# Patient Record
Sex: FEMALE | Race: WHITE | NOT HISPANIC OR LATINO | Employment: STUDENT | URBAN - METROPOLITAN AREA
[De-identification: names, ages, dates, MRNs, and addresses within clinical notes are randomized per-mention and may not be internally consistent; named-entity substitution may affect disease eponyms.]

---
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
## 2024-02-28 ENCOUNTER — APPOINTMENT (OUTPATIENT)
Dept: PRIMARY CARE | Facility: CLINIC | Age: 19
End: 2024-02-28
Payer: COMMERCIAL

## 2024-03-05 ENCOUNTER — OFFICE VISIT (OUTPATIENT)
Dept: PRIMARY CARE | Facility: CLINIC | Age: 19
End: 2024-03-05
Payer: COMMERCIAL

## 2024-03-05 ENCOUNTER — APPOINTMENT (OUTPATIENT)
Dept: PRIMARY CARE | Facility: CLINIC | Age: 19
End: 2024-03-05
Payer: COMMERCIAL

## 2024-03-05 VITALS
DIASTOLIC BLOOD PRESSURE: 80 MMHG | SYSTOLIC BLOOD PRESSURE: 112 MMHG | RESPIRATION RATE: 20 BRPM | TEMPERATURE: 99.1 F | HEART RATE: 88 BPM | HEIGHT: 63 IN | WEIGHT: 139 LBS | BODY MASS INDEX: 24.63 KG/M2

## 2024-03-05 DIAGNOSIS — R00.2 RAPID PALPITATIONS: Primary | ICD-10-CM

## 2024-03-05 DIAGNOSIS — Z13.31 DEPRESSION SCREEN: ICD-10-CM

## 2024-03-05 DIAGNOSIS — R55 SYNCOPE, UNSPECIFIED SYNCOPE TYPE: ICD-10-CM

## 2024-03-05 DIAGNOSIS — Z00.00 HEALTHCARE MAINTENANCE: ICD-10-CM

## 2024-03-05 PROBLEM — J02.0 STREPTOCOCCAL SORE THROAT: Status: RESOLVED | Noted: 2023-10-13 | Resolved: 2024-03-05

## 2024-03-05 PROBLEM — H10.33 ACUTE CONJUNCTIVITIS OF BOTH EYES: Status: RESOLVED | Noted: 2023-10-13 | Resolved: 2024-03-05

## 2024-03-05 PROCEDURE — 96127 BRIEF EMOTIONAL/BEHAV ASSMT: CPT | Performed by: FAMILY MEDICINE

## 2024-03-05 PROCEDURE — 99214 OFFICE O/P EST MOD 30 MIN: CPT | Performed by: FAMILY MEDICINE

## 2024-03-05 RX ORDER — DESOGESTREL AND ETHINYL ESTRADIOL 0.15-0.03
KIT ORAL
COMMUNITY
Start: 2024-02-27

## 2024-03-05 ASSESSMENT — ENCOUNTER SYMPTOMS
FATIGUE: 1
DIARRHEA: 0
FEVER: 0
NERVOUS/ANXIOUS: 0
BLOOD IN STOOL: 0
WHEEZING: 0
CONSTIPATION: 0
VOMITING: 0
HEMATURIA: 0
NAUSEA: 1
DIZZINESS: 1
PALPITATIONS: 1
DYSPHORIC MOOD: 0
TREMORS: 1
COUGH: 0
SHORTNESS OF BREATH: 0
SEIZURES: 0
WEAKNESS: 0
LIGHT-HEADEDNESS: 1

## 2024-03-05 ASSESSMENT — PATIENT HEALTH QUESTIONNAIRE - PHQ9
2. FEELING DOWN, DEPRESSED OR HOPELESS: NOT AT ALL
SUM OF ALL RESPONSES TO PHQ9 QUESTIONS 1 AND 2: 0
1. LITTLE INTEREST OR PLEASURE IN DOING THINGS: NOT AT ALL

## 2024-03-05 NOTE — PROGRESS NOTES
"Subjective   Patient ID: Sara Cheema is a 18 y.o. female who presents for Dizziness (Pt has fainted when reaching up into cabinet. She has also bent down to tie her shoe and fainted. Would like tested for POTS).    HPI     Review of Systems   Constitutional:  Positive for fatigue. Negative for fever.   HENT:  Negative for hearing loss.    Eyes:  Negative for visual disturbance.   Respiratory:  Negative for cough, shortness of breath and wheezing.    Cardiovascular:  Positive for chest pain and palpitations.        Pt concerned regarding POTS  Has already seen card/[facility] and working dx of POTS, did EKG, holter, ordered echo and tilt table but pt never had it done  Pt symptoms at college.  Pt has increased flds, increased salt, used compression hose   Gastrointestinal:  Positive for nausea. Negative for blood in stool, constipation, diarrhea and vomiting.   Genitourinary:  Negative for dyspareunia, hematuria, vaginal bleeding and vaginal discharge.   Neurological:  Positive for dizziness, tremors, syncope and light-headedness. Negative for seizures and weakness.        Pt with working dx of POTS, did not complete all testing   Psychiatric/Behavioral:  Negative for dysphoric mood and suicidal ideas. The patient is not nervous/anxious.        Objective   /80   Pulse 88   Temp 37.3 °C (99.1 °F)   Resp 20   Ht 1.588 m (5' 2.5\")   Wt 63 kg (139 lb)   BMI 25.02 kg/m²     Physical Exam  Vitals and nursing note reviewed.   Constitutional:       General: She is not in acute distress.     Appearance: Normal appearance.   HENT:      Head: Normocephalic and atraumatic.   Cardiovascular:      Rate and Rhythm: Normal rate and regular rhythm.      Heart sounds: Normal heart sounds.   Pulmonary:      Effort: Pulmonary effort is normal.      Breath sounds: Normal breath sounds.   Abdominal:      General: Abdomen is flat. Bowel sounds are normal.      Palpations: Abdomen is soft.   Musculoskeletal:         General: No " deformity.      Cervical back: Neck supple.   Lymphadenopathy:      Cervical: No cervical adenopathy.   Skin:     General: Skin is warm and dry.   Neurological:      General: No focal deficit present.      Mental Status: She is alert.   Psychiatric:         Mood and Affect: Mood normal.         Behavior: Behavior normal.         Assessment/Plan   Problem List Items Addressed This Visit             ICD-10-CM    Depression screen Z13.31    Rapid palpitations - Primary R00.2    Relevant Orders    Referral to Cardiology    CBC    Comprehensive Metabolic Panel    Lipid Panel    TSH with reflex to Free T4 if abnormal    Syncope R55    Relevant Orders    Referral to Cardiology    Vitamin D 25-Hydroxy,Total    Folate    Iron and TIBC    Vitamin B12     Other Visit Diagnoses         Codes    Healthcare maintenance     Z00.00    Relevant Orders    Follow Up In [facility] - PCP - Health Maintenance

## 2024-04-22 ENCOUNTER — APPOINTMENT (OUTPATIENT)
Dept: CARDIOLOGY | Facility: CLINIC | Age: 19
End: 2024-04-22
Payer: COMMERCIAL

## 2024-05-07 ENCOUNTER — APPOINTMENT (OUTPATIENT)
Dept: CARDIOLOGY | Facility: CLINIC | Age: 19
End: 2024-05-07
Payer: COMMERCIAL

## 2024-07-11 ENCOUNTER — OFFICE VISIT (OUTPATIENT)
Dept: URGENT CARE | Facility: CLINIC | Age: 19
End: 2024-07-11
Payer: COMMERCIAL

## 2024-07-11 VITALS
TEMPERATURE: 98.3 F | HEART RATE: 92 BPM | BODY MASS INDEX: 26.26 KG/M2 | DIASTOLIC BLOOD PRESSURE: 77 MMHG | SYSTOLIC BLOOD PRESSURE: 113 MMHG | OXYGEN SATURATION: 95 % | HEIGHT: 63 IN | WEIGHT: 148.2 LBS

## 2024-07-11 DIAGNOSIS — J06.9 VIRAL UPPER RESPIRATORY TRACT INFECTION: Primary | ICD-10-CM

## 2024-07-11 DIAGNOSIS — R05.1 ACUTE COUGH: ICD-10-CM

## 2024-07-11 DIAGNOSIS — J02.9 SORE THROAT: ICD-10-CM

## 2024-07-11 LAB
POC RAPID STREP: NEGATIVE
POC SARS-COV-2 AG BINAX: NORMAL

## 2024-07-11 PROCEDURE — 87880 STREP A ASSAY W/OPTIC: CPT | Mod: CLIA WAIVED TEST

## 2024-07-11 PROCEDURE — 99213 OFFICE O/P EST LOW 20 MIN: CPT

## 2024-07-11 PROCEDURE — 87811 SARS-COV-2 COVID19 W/OPTIC: CPT | Mod: CLIA WAIVED TEST

## 2024-07-11 PROCEDURE — 87636 SARSCOV2 & INF A&B AMP PRB: CPT

## 2024-07-11 RX ORDER — VALACYCLOVIR HYDROCHLORIDE 1 G/1
TABLET, FILM COATED ORAL
COMMUNITY
Start: 2024-07-04

## 2024-07-11 RX ORDER — BENZONATATE 100 MG/1
100 CAPSULE ORAL 3 TIMES DAILY PRN
Qty: 21 CAPSULE | Refills: 0 | Status: SHIPPED | OUTPATIENT
Start: 2024-07-11 | End: 2024-07-21

## 2024-07-11 RX ORDER — PHENAZOPYRIDINE HYDROCHLORIDE 100 MG/1
100 TABLET, FILM COATED ORAL 3 TIMES DAILY PRN
COMMUNITY
Start: 2024-05-15 | End: 2025-05-15

## 2024-07-11 RX ORDER — BROMPHENIRAMINE MALEATE, PSEUDOEPHEDRINE HYDROCHLORIDE, AND DEXTROMETHORPHAN HYDROBROMIDE 2; 30; 10 MG/5ML; MG/5ML; MG/5ML
10 SYRUP ORAL 4 TIMES DAILY PRN
Qty: 120 ML | Refills: 0 | Status: SHIPPED | OUTPATIENT
Start: 2024-07-11 | End: 2024-07-21

## 2024-07-11 ASSESSMENT — ENCOUNTER SYMPTOMS
CARDIOVASCULAR NEGATIVE: 1
DIAPHORESIS: 0
VOMITING: 0
HEADACHES: 1
MYALGIAS: 0
FACIAL SWELLING: 0
DIARRHEA: 1
VOICE CHANGE: 0
SINUS PRESSURE: 0
MUSCULOSKELETAL NEGATIVE: 1
RHINORRHEA: 1
SHORTNESS OF BREATH: 0
BACK PAIN: 0
FATIGUE: 1
FEVER: 1
WEAKNESS: 0
WHEEZING: 0
ABDOMINAL PAIN: 0
DIZZINESS: 0
TROUBLE SWALLOWING: 0
COUGH: 1
PALPITATIONS: 0
NAUSEA: 0
CHEST TIGHTNESS: 0
CHILLS: 0
SORE THROAT: 1
ARTHRALGIAS: 0

## 2024-07-11 ASSESSMENT — VISUAL ACUITY: OU: 1

## 2024-07-11 NOTE — PROGRESS NOTES
Subjective   History  Sara Cheema is a 18 y.o. female who presents for Cough.    Patient presents with a sore throat, runny nose, productive cough, and fatigue for the last 2 days. 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 She denies history of smoking or asthma. Patient reports trying OTC medications including cold medicines with moderate relief.       History provided by:  Patient and medical records   used: No    Cough  The cough is Productive of sputum. Associated symptoms include a fever, headaches, postnasal drip, rhinorrhea and a sore throat. Pertinent negatives include no chest pain, chills, ear pain, myalgias, rash, shortness of breath or wheezing. Risk factors for lung disease include occupational exposure. She has tried OTC cough suppressant for the symptoms. The treatment provided moderate relief. There is no history of asthma.     No past surgical history on file.  Social History     Tobacco Use    Smoking status: Never     Passive exposure: Never    Smokeless tobacco: Never   Vaping Use    Vaping status: Former   Substance Use Topics    Alcohol use: Not on file    Drug use: Not on file       Review of Systems   Review of Systems   Constitutional:  Positive for fatigue and fever. Negative for chills and diaphoresis.   HENT:  Positive for postnasal drip, rhinorrhea and sore throat. Negative for congestion, ear pain, facial swelling, sinus pressure, trouble swallowing and voice change.    Respiratory:  Positive for cough. Negative for chest tightness, shortness of breath and wheezing.    Cardiovascular: Negative.  Negative for chest pain and palpitations.   Gastrointestinal:  Positive for diarrhea. Negative for abdominal pain, nausea and vomiting.   Musculoskeletal: Negative.  Negative for arthralgias, back pain and myalgias.   Skin: Negative.  Negative for rash.   Neurological:  Positive for headaches. Negative for dizziness and weakness.       Objective   Vital Signs  /77  "(BP Location: Left arm, Patient Position: Sitting, BP Cuff Size: Small adult)   Pulse 92   Temp 36.8 °C (98.3 °F) (Oral)   Ht 1.6 m (5' 3\")   Wt 67.2 kg (148 lb 3.2 oz)   LMP 07/05/2024 (Exact Date)   SpO2 95%   BMI 26.25 kg/m²    All vitals have been reviewed and are stable.     Physical Exam  Physical Exam  Vitals and nursing note reviewed.   Constitutional:       General: She is not in acute distress.     Appearance: Normal appearance. She is ill-appearing.   HENT:      Head: Normocephalic and atraumatic. No right periorbital erythema or left periorbital erythema.      Jaw: There is normal jaw occlusion.      Right Ear: External ear normal.      Left Ear: External ear normal.      Nose: Mucosal edema and rhinorrhea present. No congestion.      Mouth/Throat:      Lips: Pink. No lesions.      Mouth: Mucous membranes are moist.      Palate: No lesions.      Pharynx: Uvula midline. Posterior oropharyngeal erythema and postnasal drip present. No uvula swelling.      Tonsils: No tonsillar exudate.   Eyes:      General: Lids are normal. Vision grossly intact.         Right eye: No discharge.         Left eye: No discharge.      Extraocular Movements: Extraocular movements intact.      Conjunctiva/sclera: Conjunctivae normal.      Right eye: Right conjunctiva is not injected.      Left eye: Left conjunctiva is not injected.      Pupils: Pupils are equal, round, and reactive to light.   Cardiovascular:      Rate and Rhythm: Normal rate and regular rhythm.      Heart sounds: Normal heart sounds.   Pulmonary:      Effort: Pulmonary effort is normal. No tachypnea, accessory muscle usage or respiratory distress.      Breath sounds: Normal breath sounds and air entry. No stridor or transmitted upper airway sounds. No wheezing, rhonchi or rales.   Abdominal:      General: Abdomen is flat. Bowel sounds are normal.      Palpations: Abdomen is soft.      Tenderness: There is no abdominal tenderness.   Musculoskeletal:       "   General: Normal range of motion.      Cervical back: Full passive range of motion without pain, normal range of motion and neck supple.   Lymphadenopathy:      Cervical: No cervical adenopathy.   Skin:     General: Skin is warm and dry.      Coloration: Skin is not pale or sallow.      Findings: No erythema, petechiae or rash.   Neurological:      General: No focal deficit present.      Mental Status: She is alert and oriented to person, place, and time. Mental status is at baseline.   Psychiatric:         Mood and Affect: Mood normal.         Behavior: Behavior normal.         Diagnostic Results   Recent Results (from the past 48 hour(s))   POCT rapid strep A manually resulted    Collection Time: 07/11/24  8:00 PM   Result Value Ref Range    POC Rapid Strep Negative Negative   POCT BinaxNOW Covid-19 Ag Card manually resulted    Collection Time: 07/11/24  8:00 PM   Result Value Ref Range    POC CAMERON-COV-2 AG  Presumptive negative test for SARS-CoV-2 (no antigen detected)     Presumptive negative test for SARS-CoV-2 (no antigen detected)       Assessment/Plan   Procedures   N/A    Problem List Items Addressed This Visit    None  Visit Diagnoses       Viral upper respiratory tract infection    -  Primary    Relevant Medications    benzonatate (Tessalon) 100 mg capsule    brompheniramine-pseudoeph-DM 2-30-10 mg/5 mL syrup    Other Relevant Orders    Influenza A, and B PCR    Sars-CoV-2 PCR    Sore throat        Relevant Orders    POCT rapid strep A manually resulted (Completed)    POCT BinaxNOW Covid-19 Ag Card manually resulted (Completed)    Acute cough        Relevant Medications    benzonatate (Tessalon) 100 mg capsule    brompheniramine-pseudoeph-DM 2-30-10 mg/5 mL syrup    Other Relevant Orders    Influenza A, and B PCR    Sars-CoV-2 PCR    POCT BinaxNOW Covid-19 Ag Card manually resulted (Completed)            UC Course  MDM    Patient disposition: Home    Red flags for reporting to ER have been reviewed with  the patient.    Current diagnosis, any medication changes, and all in-office lab or radiologic results have been reviewed with the patient at the time of the visit.   If symptoms do not improve or worsen, patient is to follow up with PCP or report to the emergency room.   Patient is alert and oriented x3 and non-toxic appearing. Vital signs are stable.   Patient and/or guardian has sufficient decision-making capabilities at this time and reports understanding and agreement with the treatment plan made through shared decision-making.

## 2024-07-11 NOTE — LETTER
July 11, 2024     Patient: Sara Cheema   YOB: 2005   Date of Visit: 7/11/2024       To Whom It May Concern:    Sara Cheema was seen in my clinic on 7/11/2024 at 7:25 pm. Please excuse Sara for her absence from work on this day to make the appointment.    If you have any questions or concerns, please don't hesitate to call.         Sincerely,         Yokasta Tristan PA-C        CC: No Recipients

## 2024-07-11 NOTE — LETTER
July 12, 2024     Patient: Sara Cheema   YOB: 2005   Date of Visit: 7/11/2024       To Whom It May Concern:  Please excuse pt from work duties from 7-11-24 until 7-16-24. Pt is ok to return to normal duty on 7-16-24 pending Sx improvement .    It is recommended that they remain isolated for at least 5 days from the onset of symptoms and until symptoms are improved and fever-free without medication for at least 24 hours.      If you have any questions or concerns, please don't hesitate to call.     [phone]    Sincerely,        [facility]     CC: No Recipients

## 2024-07-12 LAB
FLUAV RNA RESP QL NAA+PROBE: NOT DETECTED
FLUBV RNA RESP QL NAA+PROBE: NOT DETECTED
SARS-COV-2 RNA RESP QL NAA+PROBE: DETECTED

## 2024-07-23 ENCOUNTER — APPOINTMENT (OUTPATIENT)
Dept: OBSTETRICS AND GYNECOLOGY | Facility: CLINIC | Age: 19
End: 2024-07-23
Payer: COMMERCIAL

## 2024-08-06 NOTE — TELEPHONE ENCOUNTER
- continue levothyroxine   ----- Message from Angelica Calle MD sent at 5/31/2023  7:49 AM EDT -----  Call pt, has mild anemia, likely iron related . Birth control should help this. Rec starting otc multivit with iron, centrum or one a day womens. Increase consumption of green leafy veggies. Recheck cbc in 6 mo

## 2024-09-10 ENCOUNTER — APPOINTMENT (OUTPATIENT)
Dept: ORTHOPEDIC SURGERY | Facility: CLINIC | Age: 19
End: 2024-09-10
Payer: COMMERCIAL

## 2024-12-31 ENCOUNTER — APPOINTMENT (OUTPATIENT)
Dept: PRIMARY CARE | Facility: CLINIC | Age: 19
End: 2024-12-31
Payer: COMMERCIAL